# Patient Record
Sex: FEMALE | Race: WHITE
[De-identification: names, ages, dates, MRNs, and addresses within clinical notes are randomized per-mention and may not be internally consistent; named-entity substitution may affect disease eponyms.]

---

## 2017-02-02 ENCOUNTER — TRANSCRIPTION ENCOUNTER (OUTPATIENT)
Age: 77
End: 2017-02-02

## 2017-03-30 ENCOUNTER — TRANSCRIPTION ENCOUNTER (OUTPATIENT)
Age: 77
End: 2017-03-30

## 2017-06-17 ENCOUNTER — OUTPATIENT (OUTPATIENT)
Dept: OUTPATIENT SERVICES | Facility: HOSPITAL | Age: 77
LOS: 1 days | Discharge: HOME | End: 2017-06-17

## 2017-06-28 DIAGNOSIS — E78.2 MIXED HYPERLIPIDEMIA: ICD-10-CM

## 2017-06-28 DIAGNOSIS — E11.9 TYPE 2 DIABETES MELLITUS WITHOUT COMPLICATIONS: ICD-10-CM

## 2017-06-28 DIAGNOSIS — I10 ESSENTIAL (PRIMARY) HYPERTENSION: ICD-10-CM

## 2017-07-08 ENCOUNTER — OUTPATIENT (OUTPATIENT)
Dept: OUTPATIENT SERVICES | Facility: HOSPITAL | Age: 77
LOS: 1 days | Discharge: HOME | End: 2017-07-08

## 2017-07-08 DIAGNOSIS — M25.551 PAIN IN RIGHT HIP: ICD-10-CM

## 2017-09-01 ENCOUNTER — OUTPATIENT (OUTPATIENT)
Dept: OUTPATIENT SERVICES | Facility: HOSPITAL | Age: 77
LOS: 1 days | Discharge: HOME | End: 2017-09-01

## 2017-09-01 DIAGNOSIS — C55 MALIGNANT NEOPLASM OF UTERUS, PART UNSPECIFIED: ICD-10-CM

## 2017-10-06 ENCOUNTER — OUTPATIENT (OUTPATIENT)
Dept: OUTPATIENT SERVICES | Facility: HOSPITAL | Age: 77
LOS: 1 days | Discharge: HOME | End: 2017-10-06

## 2017-10-11 DIAGNOSIS — E66.9 OBESITY, UNSPECIFIED: ICD-10-CM

## 2017-10-11 DIAGNOSIS — H25.89 OTHER AGE-RELATED CATARACT: ICD-10-CM

## 2017-10-11 DIAGNOSIS — Z88.5 ALLERGY STATUS TO NARCOTIC AGENT: ICD-10-CM

## 2017-10-11 DIAGNOSIS — E78.00 PURE HYPERCHOLESTEROLEMIA, UNSPECIFIED: ICD-10-CM

## 2017-10-11 DIAGNOSIS — Z88.2 ALLERGY STATUS TO SULFONAMIDES: ICD-10-CM

## 2017-10-11 DIAGNOSIS — I10 ESSENTIAL (PRIMARY) HYPERTENSION: ICD-10-CM

## 2018-01-30 ENCOUNTER — OUTPATIENT (OUTPATIENT)
Dept: OUTPATIENT SERVICES | Facility: HOSPITAL | Age: 78
LOS: 1 days | Discharge: HOME | End: 2018-01-30

## 2018-01-30 DIAGNOSIS — E78.2 MIXED HYPERLIPIDEMIA: ICD-10-CM

## 2018-01-30 DIAGNOSIS — E11.9 TYPE 2 DIABETES MELLITUS WITHOUT COMPLICATIONS: ICD-10-CM

## 2018-01-30 DIAGNOSIS — I10 ESSENTIAL (PRIMARY) HYPERTENSION: ICD-10-CM

## 2018-05-17 ENCOUNTER — OUTPATIENT (OUTPATIENT)
Dept: OUTPATIENT SERVICES | Facility: HOSPITAL | Age: 78
LOS: 1 days | Discharge: HOME | End: 2018-05-17

## 2018-05-17 DIAGNOSIS — I10 ESSENTIAL (PRIMARY) HYPERTENSION: ICD-10-CM

## 2018-05-17 DIAGNOSIS — E55.9 VITAMIN D DEFICIENCY, UNSPECIFIED: ICD-10-CM

## 2018-05-17 DIAGNOSIS — E78.2 MIXED HYPERLIPIDEMIA: ICD-10-CM

## 2018-05-17 DIAGNOSIS — E11.9 TYPE 2 DIABETES MELLITUS WITHOUT COMPLICATIONS: ICD-10-CM

## 2018-08-11 ENCOUNTER — OUTPATIENT (OUTPATIENT)
Dept: OUTPATIENT SERVICES | Facility: HOSPITAL | Age: 78
LOS: 1 days | Discharge: HOME | End: 2018-08-11

## 2018-08-11 DIAGNOSIS — Z12.31 ENCOUNTER FOR SCREENING MAMMOGRAM FOR MALIGNANT NEOPLASM OF BREAST: ICD-10-CM

## 2018-09-13 ENCOUNTER — OUTPATIENT (OUTPATIENT)
Dept: OUTPATIENT SERVICES | Facility: HOSPITAL | Age: 78
LOS: 1 days | Discharge: HOME | End: 2018-09-13

## 2018-09-13 DIAGNOSIS — R06.00 DYSPNEA, UNSPECIFIED: ICD-10-CM

## 2018-09-15 ENCOUNTER — OUTPATIENT (OUTPATIENT)
Dept: OUTPATIENT SERVICES | Facility: HOSPITAL | Age: 78
LOS: 1 days | Discharge: HOME | End: 2018-09-15

## 2018-09-15 DIAGNOSIS — I10 ESSENTIAL (PRIMARY) HYPERTENSION: ICD-10-CM

## 2018-09-15 DIAGNOSIS — E78.2 MIXED HYPERLIPIDEMIA: ICD-10-CM

## 2018-09-15 DIAGNOSIS — Z00.00 ENCOUNTER FOR GENERAL ADULT MEDICAL EXAMINATION WITHOUT ABNORMAL FINDINGS: ICD-10-CM

## 2018-09-15 DIAGNOSIS — E11.9 TYPE 2 DIABETES MELLITUS WITHOUT COMPLICATIONS: ICD-10-CM

## 2019-04-13 ENCOUNTER — OUTPATIENT (OUTPATIENT)
Dept: OUTPATIENT SERVICES | Facility: HOSPITAL | Age: 79
LOS: 1 days | Discharge: HOME | End: 2019-04-13

## 2019-04-13 DIAGNOSIS — I10 ESSENTIAL (PRIMARY) HYPERTENSION: ICD-10-CM

## 2019-04-13 DIAGNOSIS — E78.2 MIXED HYPERLIPIDEMIA: ICD-10-CM

## 2019-04-18 ENCOUNTER — OUTPATIENT (OUTPATIENT)
Dept: OUTPATIENT SERVICES | Facility: HOSPITAL | Age: 79
LOS: 1 days | Discharge: HOME | End: 2019-04-18

## 2019-04-18 DIAGNOSIS — R53.83 OTHER FATIGUE: ICD-10-CM

## 2019-10-15 ENCOUNTER — OUTPATIENT (OUTPATIENT)
Dept: OUTPATIENT SERVICES | Facility: HOSPITAL | Age: 79
LOS: 1 days | Discharge: HOME | End: 2019-10-15

## 2019-10-15 DIAGNOSIS — I10 ESSENTIAL (PRIMARY) HYPERTENSION: ICD-10-CM

## 2019-10-15 DIAGNOSIS — E78.2 MIXED HYPERLIPIDEMIA: ICD-10-CM

## 2020-01-11 ENCOUNTER — OUTPATIENT (OUTPATIENT)
Dept: OUTPATIENT SERVICES | Facility: HOSPITAL | Age: 80
LOS: 1 days | Discharge: HOME | End: 2020-01-11
Payer: MEDICARE

## 2020-01-11 DIAGNOSIS — R05 COUGH: ICD-10-CM

## 2020-01-11 PROCEDURE — 71046 X-RAY EXAM CHEST 2 VIEWS: CPT | Mod: 26

## 2020-03-20 ENCOUNTER — TRANSCRIPTION ENCOUNTER (OUTPATIENT)
Age: 80
End: 2020-03-20

## 2021-02-06 ENCOUNTER — OUTPATIENT (OUTPATIENT)
Dept: OUTPATIENT SERVICES | Facility: HOSPITAL | Age: 81
LOS: 1 days | Discharge: HOME | End: 2021-02-06
Payer: MEDICARE

## 2021-02-06 DIAGNOSIS — Z12.31 ENCOUNTER FOR SCREENING MAMMOGRAM FOR MALIGNANT NEOPLASM OF BREAST: ICD-10-CM

## 2021-02-06 PROCEDURE — 77063 BREAST TOMOSYNTHESIS BI: CPT | Mod: 26

## 2021-02-06 PROCEDURE — 77067 SCR MAMMO BI INCL CAD: CPT | Mod: 26

## 2021-02-09 DIAGNOSIS — Z78.0 ASYMPTOMATIC MENOPAUSAL STATE: ICD-10-CM

## 2021-02-09 DIAGNOSIS — M89.9 DISORDER OF BONE, UNSPECIFIED: ICD-10-CM

## 2021-02-09 DIAGNOSIS — Z13.820 ENCOUNTER FOR SCREENING FOR OSTEOPOROSIS: ICD-10-CM

## 2021-02-23 ENCOUNTER — OUTPATIENT (OUTPATIENT)
Dept: OUTPATIENT SERVICES | Facility: HOSPITAL | Age: 81
LOS: 1 days | Discharge: HOME | End: 2021-02-23
Payer: MEDICARE

## 2021-02-23 DIAGNOSIS — R92.8 OTHER ABNORMAL AND INCONCLUSIVE FINDINGS ON DIAGNOSTIC IMAGING OF BREAST: ICD-10-CM

## 2021-02-23 PROBLEM — Z00.00 ENCOUNTER FOR PREVENTIVE HEALTH EXAMINATION: Status: ACTIVE | Noted: 2021-02-23

## 2021-02-23 PROCEDURE — G0279: CPT | Mod: 26

## 2021-02-23 PROCEDURE — 77065 DX MAMMO INCL CAD UNI: CPT | Mod: 26,LT

## 2021-02-23 PROCEDURE — 76642 ULTRASOUND BREAST LIMITED: CPT | Mod: 26,LT

## 2021-03-23 ENCOUNTER — APPOINTMENT (OUTPATIENT)
Dept: PULMONOLOGY | Facility: CLINIC | Age: 81
End: 2021-03-23

## 2021-06-30 ENCOUNTER — APPOINTMENT (OUTPATIENT)
Age: 81
End: 2021-06-30
Payer: MEDICARE

## 2021-06-30 VITALS
WEIGHT: 230 LBS | OXYGEN SATURATION: 98 % | DIASTOLIC BLOOD PRESSURE: 78 MMHG | HEART RATE: 76 BPM | BODY MASS INDEX: 36.96 KG/M2 | RESPIRATION RATE: 14 BRPM | HEIGHT: 66 IN | SYSTOLIC BLOOD PRESSURE: 128 MMHG

## 2021-06-30 PROCEDURE — 99213 OFFICE O/P EST LOW 20 MIN: CPT

## 2021-06-30 RX ORDER — ALBUTEROL SULFATE 90 UG/1
108 (90 BASE) INHALANT RESPIRATORY (INHALATION) EVERY 4 HOURS
Qty: 1 | Refills: 3 | Status: ACTIVE | COMMUNITY
Start: 2021-06-30 | End: 1900-01-01

## 2021-06-30 NOTE — DISCUSSION/SUMMARY
[FreeTextEntry1] : KYLE COUNSELED ABOUT CPAP USAGE WILL CHANGE MASK ALSO WITH HX OF A FIB\par SOB/ WHEEZING INHLAERS AS NEEDED\par PFT\par WEIGHT LOSS

## 2021-08-24 ENCOUNTER — OUTPATIENT (OUTPATIENT)
Dept: OUTPATIENT SERVICES | Facility: HOSPITAL | Age: 81
LOS: 1 days | Discharge: HOME | End: 2021-08-24
Payer: MEDICARE

## 2021-08-24 DIAGNOSIS — R92.8 OTHER ABNORMAL AND INCONCLUSIVE FINDINGS ON DIAGNOSTIC IMAGING OF BREAST: ICD-10-CM

## 2021-08-24 PROCEDURE — G0279: CPT | Mod: 26

## 2021-08-24 PROCEDURE — 77065 DX MAMMO INCL CAD UNI: CPT | Mod: 26,LT

## 2021-11-18 ENCOUNTER — TRANSCRIPTION ENCOUNTER (OUTPATIENT)
Age: 81
End: 2021-11-18

## 2021-12-17 ENCOUNTER — LABORATORY RESULT (OUTPATIENT)
Age: 81
End: 2021-12-17

## 2021-12-20 ENCOUNTER — APPOINTMENT (OUTPATIENT)
Age: 81
End: 2021-12-20
Payer: MEDICARE

## 2021-12-20 VITALS
HEART RATE: 89 BPM | SYSTOLIC BLOOD PRESSURE: 120 MMHG | RESPIRATION RATE: 12 BRPM | DIASTOLIC BLOOD PRESSURE: 80 MMHG | HEIGHT: 66 IN | WEIGHT: 214 LBS | BODY MASS INDEX: 34.39 KG/M2 | OXYGEN SATURATION: 100 %

## 2021-12-20 PROCEDURE — 99213 OFFICE O/P EST LOW 20 MIN: CPT | Mod: 25

## 2021-12-20 PROCEDURE — 94010 BREATHING CAPACITY TEST: CPT

## 2021-12-20 PROCEDURE — 94729 DIFFUSING CAPACITY: CPT

## 2021-12-20 PROCEDURE — 94727 GAS DIL/WSHOT DETER LNG VOL: CPT

## 2021-12-20 NOTE — DISCUSSION/SUMMARY
[FreeTextEntry1] : KYLE COUNSELED ABOUT CPAP USAGE AND COMPLIANCE\par SOB/ WHEEZING INHLAERS AS NEEDED PFT REVIEWED\par WEIGHT LOSS

## 2022-06-20 ENCOUNTER — APPOINTMENT (OUTPATIENT)
Age: 82
End: 2022-06-20
Payer: MEDICARE

## 2022-06-20 VITALS
BODY MASS INDEX: 33.59 KG/M2 | SYSTOLIC BLOOD PRESSURE: 126 MMHG | WEIGHT: 209 LBS | RESPIRATION RATE: 12 BRPM | DIASTOLIC BLOOD PRESSURE: 76 MMHG | HEART RATE: 101 BPM | HEIGHT: 66 IN | OXYGEN SATURATION: 99 %

## 2022-06-20 PROCEDURE — 99213 OFFICE O/P EST LOW 20 MIN: CPT

## 2022-06-20 NOTE — REASON FOR VISIT
[Follow-Up] : a follow-up visit [Asthma] : asthma [Sleep Apnea] : sleep apnea [Cough] : cough [Shortness of Breath] : shortness of breath

## 2022-06-20 NOTE — HISTORY OF PRESENT ILLNESS
[TextBox_4] : ASTHMA AS NEEDED RESCUE INHALERS DOING WELL SP PFT\par KYLE COMPLIANT AND BENEFITING\par SOB ON EXERTION

## 2022-06-20 NOTE — DISCUSSION/SUMMARY
[FreeTextEntry1] : KYLE COUNSELED ABOUT CPAP USAGE AND COMPLIANCE\par SOB/ WHEEZING INHALERS AS NEEDED PFT REVIEWED\par WEIGHT LOSS\par CXR

## 2022-07-15 ENCOUNTER — NON-APPOINTMENT (OUTPATIENT)
Age: 82
End: 2022-07-15

## 2022-07-15 ENCOUNTER — APPOINTMENT (OUTPATIENT)
Dept: OPHTHALMOLOGY | Facility: CLINIC | Age: 82
End: 2022-07-15

## 2022-07-15 PROCEDURE — 92004 COMPRE OPH EXAM NEW PT 1/>: CPT

## 2022-07-15 PROCEDURE — 92083 EXTENDED VISUAL FIELD XM: CPT

## 2022-07-15 PROCEDURE — 92133 CPTRZD OPH DX IMG PST SGM ON: CPT

## 2022-07-23 ENCOUNTER — RESULT REVIEW (OUTPATIENT)
Age: 82
End: 2022-07-23

## 2022-07-23 ENCOUNTER — OUTPATIENT (OUTPATIENT)
Dept: OUTPATIENT SERVICES | Facility: HOSPITAL | Age: 82
LOS: 1 days | Discharge: HOME | End: 2022-07-23

## 2022-07-23 DIAGNOSIS — R51.9 HEADACHE, UNSPECIFIED: ICD-10-CM

## 2022-07-23 DIAGNOSIS — R07.9 CHEST PAIN, UNSPECIFIED: ICD-10-CM

## 2022-07-23 PROCEDURE — 71046 X-RAY EXAM CHEST 2 VIEWS: CPT | Mod: 26

## 2022-07-23 PROCEDURE — 70553 MRI BRAIN STEM W/O & W/DYE: CPT | Mod: 26,MH

## 2022-07-23 PROCEDURE — 70543 MRI ORBT/FAC/NCK W/O &W/DYE: CPT | Mod: 26,MH

## 2022-08-29 ENCOUNTER — APPOINTMENT (OUTPATIENT)
Dept: OPHTHALMOLOGY | Facility: CLINIC | Age: 82
End: 2022-08-29

## 2022-08-29 ENCOUNTER — NON-APPOINTMENT (OUTPATIENT)
Age: 82
End: 2022-08-29

## 2022-08-29 PROCEDURE — 92133 CPTRZD OPH DX IMG PST SGM ON: CPT

## 2022-08-29 PROCEDURE — 92014 COMPRE OPH EXAM EST PT 1/>: CPT

## 2022-08-29 PROCEDURE — 92083 EXTENDED VISUAL FIELD XM: CPT

## 2022-09-17 ENCOUNTER — OUTPATIENT (OUTPATIENT)
Dept: OUTPATIENT SERVICES | Facility: HOSPITAL | Age: 82
LOS: 1 days | Discharge: HOME | End: 2022-09-17

## 2022-09-17 DIAGNOSIS — Z12.31 ENCOUNTER FOR SCREENING MAMMOGRAM FOR MALIGNANT NEOPLASM OF BREAST: ICD-10-CM

## 2022-09-17 PROCEDURE — 77063 BREAST TOMOSYNTHESIS BI: CPT | Mod: 26

## 2022-09-17 PROCEDURE — 77067 SCR MAMMO BI INCL CAD: CPT | Mod: 26

## 2022-12-02 ENCOUNTER — APPOINTMENT (OUTPATIENT)
Age: 82
End: 2022-12-02

## 2022-12-02 VITALS
SYSTOLIC BLOOD PRESSURE: 116 MMHG | RESPIRATION RATE: 14 BRPM | OXYGEN SATURATION: 96 % | HEART RATE: 99 BPM | DIASTOLIC BLOOD PRESSURE: 82 MMHG | WEIGHT: 215 LBS | HEIGHT: 66 IN | BODY MASS INDEX: 34.55 KG/M2

## 2022-12-02 PROCEDURE — 99213 OFFICE O/P EST LOW 20 MIN: CPT

## 2022-12-02 RX ORDER — ALBUTEROL SULFATE 90 UG/1
108 (90 BASE) INHALANT RESPIRATORY (INHALATION) EVERY 4 HOURS
Qty: 1 | Refills: 3 | Status: ACTIVE | COMMUNITY
Start: 2022-12-02 | End: 1900-01-01

## 2022-12-02 NOTE — DISCUSSION/SUMMARY
[FreeTextEntry1] : KYLE COUNSELED ABOUT CPAP USAGE AND COMPLIANCE\par SOB/ WHEEZING INHALERS AS NEEDED PFT REVIEWED ( DO NOT WANT MAINTENANCE)\par WEIGHT LOSS\par CXR REVIEWED

## 2022-12-02 NOTE — HISTORY OF PRESENT ILLNESS
[TextBox_4] : ASTHMA AS NEEDED RESCUE INHALERS DOING WELL \par KYLE COMPLIANT AND BENEFITING\par SOB ON EXERTION

## 2023-06-05 ENCOUNTER — APPOINTMENT (OUTPATIENT)
Dept: PULMONOLOGY | Facility: CLINIC | Age: 83
End: 2023-06-05
Payer: MEDICARE

## 2023-06-05 VITALS
WEIGHT: 204 LBS | HEIGHT: 66 IN | HEART RATE: 70 BPM | SYSTOLIC BLOOD PRESSURE: 132 MMHG | RESPIRATION RATE: 14 BRPM | DIASTOLIC BLOOD PRESSURE: 80 MMHG | OXYGEN SATURATION: 99 % | BODY MASS INDEX: 32.78 KG/M2

## 2023-06-05 DIAGNOSIS — J45.909 UNSPECIFIED ASTHMA, UNCOMPLICATED: ICD-10-CM

## 2023-06-05 DIAGNOSIS — G47.33 OBSTRUCTIVE SLEEP APNEA (ADULT) (PEDIATRIC): ICD-10-CM

## 2023-06-05 DIAGNOSIS — R06.02 SHORTNESS OF BREATH: ICD-10-CM

## 2023-06-05 PROCEDURE — 99213 OFFICE O/P EST LOW 20 MIN: CPT

## 2023-06-05 NOTE — HISTORY OF PRESENT ILLNESS
[TextBox_4] : ASTHMA AS NEEDED RESCUE INHALERS DOING WELL \par KYLE COMPLIANT AND BENEFITING\par SOB ON EXERTION INTERMITTENT WHEEZING

## 2023-06-05 NOTE — DISCUSSION/SUMMARY
[FreeTextEntry1] : KYLE COUNSELED ABOUT CPAP USAGE AND COMPLIANCE\par SOB/ WHEEZING INHALERS AS NEEDED PFT REVIEWED ( DO NOT WANT MAINTENANCE)\par POX ON EXERTION\par WEIGHT LOSS\par CXR REVIEWED 7/2022

## 2023-07-01 ENCOUNTER — EMERGENCY (EMERGENCY)
Facility: HOSPITAL | Age: 83
LOS: 0 days | Discharge: HOME | End: 2023-07-02
Attending: EMERGENCY MEDICINE
Payer: MEDICARE

## 2023-07-01 VITALS
HEIGHT: 66 IN | OXYGEN SATURATION: 99 % | HEART RATE: 98 BPM | TEMPERATURE: 96 F | RESPIRATION RATE: 18 BRPM | WEIGHT: 205.03 LBS | SYSTOLIC BLOOD PRESSURE: 157 MMHG | DIASTOLIC BLOOD PRESSURE: 80 MMHG
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
## 2023-07-01 DIAGNOSIS — Z88.5 ALLERGY STATUS TO NARCOTIC AGENT: ICD-10-CM

## 2023-07-01 DIAGNOSIS — Z88.2 ALLERGY STATUS TO SULFONAMIDES: ICD-10-CM

## 2023-07-01 DIAGNOSIS — S09.90XA UNSPECIFIED INJURY OF HEAD, INITIAL ENCOUNTER: ICD-10-CM
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
## 2023-07-01 PROCEDURE — 70450 CT HEAD/BRAIN W/O DYE: CPT | Mod: 26,MA

## 2023-07-01 PROCEDURE — 99284 EMERGENCY DEPT VISIT MOD MDM: CPT | Mod: 25

## 2023-07-01 PROCEDURE — 72125 CT NECK SPINE W/O DYE: CPT | Mod: 26,MA

## 2023-07-01 PROCEDURE — 99284 EMERGENCY DEPT VISIT MOD MDM: CPT

## 2023-07-01 PROCEDURE — 70450 CT HEAD/BRAIN W/O DYE: CPT | Mod: MA

## 2023-07-01 PROCEDURE — 72125 CT NECK SPINE W/O DYE: CPT | Mod: MA

## 2023-07-01 NOTE — ED ADULT TRIAGE NOTE - CHIEF COMPLAINT QUOTE
mechanical fall at home down three steps and hit head on wall, on blood thinners, complaining of head ache

## 2023-07-02 NOTE — ED PROVIDER NOTE - ATTENDING APP SHARED VISIT CONTRIBUTION OF CARE
I have personally performed a history and physical exam on this patient and personally directed the management of the patient.  Patient 82-year-old female presents for evaluation of close head injury as she sustained a fall while tripping while walking down steps approximately several hours prior to arrival no LOC no vomiting patient is on Eliquis she denies any headache visual changes neck pain chest pain shortness of breath weakness abdominal pain or back pain she is able to ambulate well she was able to stand and walk without any hip pain    On physical exam patient is normocephalic/atraumatic pupils equal round reactive light accommodation extraocular muscles intact no hemotympanum no lund sign no raccoon eyes no septal no tenderness palpation CTL spine full range of motion of the neck chest clear auscultation bilaterally abdomen soft nontender nondistended bowel sounds positive no guarding or rebound extremities pedal pulses 2+ radial pulse 2+ no edema noted patient able to ambulate without any issues here    Assessment plan patient sustained a mechanical fall with no LOC no vomiting on Eliquis because of this we obtained CT head which was negative for acute abnormalities CT C-spine which reveals no fractures patient improved here in the emergency department discussed indications to return discussed concussion protocol with close head injury patient agreed with discharge follow-up with her PMD for further evaluation

## 2023-07-02 NOTE — ED PROVIDER NOTE - OBJECTIVE STATEMENT
Patient 82-year-old female presents for evaluation of close head injury as she sustained a fall while tripping while walking down steps approximately several hours prior to arrival no LOC no vomiting patient is on Eliquis she denies any headache visual changes neck pain chest pain shortness of breath weakness abdominal pain or back pain she is able to ambulate well she was able to stand and walk without any hip pain

## 2023-07-02 NOTE — ED PROVIDER NOTE - CLINICAL SUMMARY MEDICAL DECISION MAKING FREE TEXT BOX
patient sustained a mechanical fall with no LOC no vomiting on Eliquis because of this we obtained CT head which was negative for acute abnormalities CT C-spine which reveals no fractures patient improved here in the emergency department discussed indications to return discussed concussion protocol with close head injury patient agreed with discharge follow-up with her PMD for further evaluation

## 2023-07-02 NOTE — ED PROVIDER NOTE - NSFOLLOWUPINSTRUCTIONS_ED_ALL_ED_FT
SEE YOUR DOCTOR IN THE NEXT 24 HOURS  RETURN FOR ANY FURTHER CONCERNS      Closed Head Injury    A closed head injury is an injury to your head that may or may not involve a traumatic brain injury (TBI).  A CT scan of the head may not have been performed because they are usually normal after a concussion. Concussions are diagnosed and managed based on the history given and the symptoms experienced after the head injury. Most concussions do not cause serious problem and get better over several days.  Symptoms of TBI can be short or long lasting and include headache, dizziness, interference with memory or speech, fatigue, confusion, changes in sleep, mood changes, nausea, depression/anxiety, and dulling of senses. Make sure to obtain proper rest which includes getting plenty of sleep, avoiding excessive visual stimulation, and avoiding activities that may cause physical or mental stress. Avoid any situation where there is potential for another head injury, including sports.    SEEK IMMEDIATE MEDICAL CARE IF YOU HAVE ANY OF THE FOLLOWING SYMPTOMS: unusual drowsiness, vomiting, severe dizziness, seizures, lightheadedness, muscular weakness, different pupil sizes, visual changes, or clear or bloody discharge from your ears or nose.

## 2023-07-02 NOTE — ED PROVIDER NOTE - PATIENT PORTAL LINK FT
You can access the FollowMyHealth Patient Portal offered by [facility] by registering at the following website: [email or web address]. By joining [facility]’s FollowMyHealth portal, you will also be able to view your health information using other applications (apps) compatible with our system.

## 2023-07-02 NOTE — ED PROVIDER NOTE - NS ED ATTENDING STATEMENT MOD
This was a shared visit with the GARRISON. I reviewed and verified the documentation and independently performed the documented:

## 2023-07-02 NOTE — ED ADULT NURSE NOTE - NSFALLRISKINTERV_ED_ALL_ED

## 2023-12-06 ENCOUNTER — APPOINTMENT (OUTPATIENT)
Dept: PULMONOLOGY | Facility: CLINIC | Age: 83
End: 2023-12-06

## 2024-02-17 ENCOUNTER — EMERGENCY (EMERGENCY)
Facility: HOSPITAL | Age: 84
LOS: 0 days | Discharge: HOME | End: 2024-02-17
Attending: EMERGENCY MEDICINE
Payer: MEDICARE

## 2024-02-17 VITALS
RESPIRATION RATE: 17 BRPM | WEIGHT: 293 LBS | TEMPERATURE: 98 F | HEIGHT: 66 IN | DIASTOLIC BLOOD PRESSURE: 73 MMHG | HEART RATE: 87 BPM | SYSTOLIC BLOOD PRESSURE: 152 MMHG

## 2024-02-17 DIAGNOSIS — M10.9 GOUT, UNSPECIFIED: ICD-10-CM

## 2024-02-17 DIAGNOSIS — S09.90XA UNSPECIFIED INJURY OF HEAD, INITIAL ENCOUNTER: ICD-10-CM
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
## 2024-02-17 DIAGNOSIS — S01.01XA LACERATION WITHOUT FOREIGN BODY OF SCALP, INITIAL ENCOUNTER: ICD-10-CM

## 2024-02-17 DIAGNOSIS — I10 ESSENTIAL (PRIMARY) HYPERTENSION: ICD-10-CM

## 2024-02-17 DIAGNOSIS — Z88.0 ALLERGY STATUS TO PENICILLIN: ICD-10-CM

## 2024-02-17 DIAGNOSIS — E11.9 TYPE 2 DIABETES MELLITUS WITHOUT COMPLICATIONS: ICD-10-CM

## 2024-02-17 DIAGNOSIS — Z79.01 LONG TERM (CURRENT) USE OF ANTICOAGULANTS: ICD-10-CM

## 2024-02-17 DIAGNOSIS — Z88.2 ALLERGY STATUS TO SULFONAMIDES: ICD-10-CM
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
## 2024-02-17 LAB
ALBUMIN SERPL ELPH-MCNC: 4.3 G/DL — SIGNIFICANT CHANGE UP (ref 3.5–5.2)
ALP SERPL-CCNC: 67 U/L — SIGNIFICANT CHANGE UP (ref 30–115)
ALT FLD-CCNC: 12 U/L — SIGNIFICANT CHANGE UP (ref 0–41)
ANION GAP SERPL CALC-SCNC: 16 MMOL/L — HIGH (ref 7–14)
APTT BLD: 33.8 SEC — SIGNIFICANT CHANGE UP (ref 27–39.2)
AST SERPL-CCNC: 13 U/L — SIGNIFICANT CHANGE UP (ref 0–41)
BASOPHILS # BLD AUTO: 0.04 K/UL — SIGNIFICANT CHANGE UP (ref 0–0.2)
BASOPHILS NFR BLD AUTO: 0.6 % — SIGNIFICANT CHANGE UP (ref 0–1)
BILIRUB SERPL-MCNC: 0.4 MG/DL — SIGNIFICANT CHANGE UP (ref 0.2–1.2)
BUN SERPL-MCNC: 21 MG/DL — HIGH (ref 10–20)
CALCIUM SERPL-MCNC: 9.7 MG/DL — SIGNIFICANT CHANGE UP (ref 8.4–10.5)
CHLORIDE SERPL-SCNC: 103 MMOL/L — SIGNIFICANT CHANGE UP (ref 98–110)
CO2 SERPL-SCNC: 21 MMOL/L — SIGNIFICANT CHANGE UP (ref 17–32)
CREAT SERPL-MCNC: 1.1 MG/DL — SIGNIFICANT CHANGE UP (ref 0.7–1.5)
EGFR: 50 ML/MIN/1.73M2 — LOW
EOSINOPHIL # BLD AUTO: 0.2 K/UL — SIGNIFICANT CHANGE UP (ref 0–0.7)
EOSINOPHIL NFR BLD AUTO: 2.9 % — SIGNIFICANT CHANGE UP (ref 0–8)
GLUCOSE SERPL-MCNC: 143 MG/DL — HIGH (ref 70–99)
HCT VFR BLD CALC: 39.6 % — SIGNIFICANT CHANGE UP (ref 37–47)
HGB BLD-MCNC: 13.6 G/DL — SIGNIFICANT CHANGE UP (ref 12–16)
IMM GRANULOCYTES NFR BLD AUTO: 0.6 % — HIGH (ref 0.1–0.3)
INR BLD: 1.08 RATIO — SIGNIFICANT CHANGE UP (ref 0.65–1.3)
LACTATE SERPL-SCNC: 3.4 MMOL/L — HIGH (ref 0.7–2)
LIDOCAIN IGE QN: 62 U/L — HIGH (ref 7–60)
LYMPHOCYTES # BLD AUTO: 2.46 K/UL — SIGNIFICANT CHANGE UP (ref 1.2–3.4)
LYMPHOCYTES # BLD AUTO: 35.3 % — SIGNIFICANT CHANGE UP (ref 20.5–51.1)
MCHC RBC-ENTMCNC: 33.2 PG — HIGH (ref 27–31)
MCHC RBC-ENTMCNC: 34.3 G/DL — SIGNIFICANT CHANGE UP (ref 32–37)
MCV RBC AUTO: 96.6 FL — SIGNIFICANT CHANGE UP (ref 81–99)
MONOCYTES # BLD AUTO: 0.66 K/UL — HIGH (ref 0.1–0.6)
MONOCYTES NFR BLD AUTO: 9.5 % — HIGH (ref 1.7–9.3)
NEUTROPHILS # BLD AUTO: 3.57 K/UL — SIGNIFICANT CHANGE UP (ref 1.4–6.5)
NEUTROPHILS NFR BLD AUTO: 51.1 % — SIGNIFICANT CHANGE UP (ref 42.2–75.2)
NRBC # BLD: 0 /100 WBCS — SIGNIFICANT CHANGE UP (ref 0–0)
PLATELET # BLD AUTO: 230 K/UL — SIGNIFICANT CHANGE UP (ref 130–400)
PMV BLD: 9.2 FL — SIGNIFICANT CHANGE UP (ref 7.4–10.4)
POTASSIUM SERPL-MCNC: 4.2 MMOL/L — SIGNIFICANT CHANGE UP (ref 3.5–5)
POTASSIUM SERPL-SCNC: 4.2 MMOL/L — SIGNIFICANT CHANGE UP (ref 3.5–5)
PROT SERPL-MCNC: 6.6 G/DL — SIGNIFICANT CHANGE UP (ref 6–8)
PROTHROM AB SERPL-ACNC: 12.3 SEC — SIGNIFICANT CHANGE UP (ref 9.95–12.87)
RBC # BLD: 4.1 M/UL — LOW (ref 4.2–5.4)
RBC # FLD: 13.3 % — SIGNIFICANT CHANGE UP (ref 11.5–14.5)
SODIUM SERPL-SCNC: 140 MMOL/L — SIGNIFICANT CHANGE UP (ref 135–146)
WBC # BLD: 6.97 K/UL — SIGNIFICANT CHANGE UP (ref 4.8–10.8)
WBC # FLD AUTO: 6.97 K/UL — SIGNIFICANT CHANGE UP (ref 4.8–10.8)

## 2024-02-17 PROCEDURE — 85025 COMPLETE CBC W/AUTO DIFF WBC: CPT

## 2024-02-17 PROCEDURE — 72125 CT NECK SPINE W/O DYE: CPT | Mod: 26,MA

## 2024-02-17 PROCEDURE — 99285 EMERGENCY DEPT VISIT HI MDM: CPT | Mod: 25

## 2024-02-17 PROCEDURE — 73070 X-RAY EXAM OF ELBOW: CPT | Mod: RT

## 2024-02-17 PROCEDURE — 73070 X-RAY EXAM OF ELBOW: CPT | Mod: 26,RT

## 2024-02-17 PROCEDURE — 71045 X-RAY EXAM CHEST 1 VIEW: CPT | Mod: 26

## 2024-02-17 PROCEDURE — 85610 PROTHROMBIN TIME: CPT

## 2024-02-17 PROCEDURE — 36415 COLL VENOUS BLD VENIPUNCTURE: CPT

## 2024-02-17 PROCEDURE — 83605 ASSAY OF LACTIC ACID: CPT

## 2024-02-17 PROCEDURE — 71045 X-RAY EXAM CHEST 1 VIEW: CPT

## 2024-02-17 PROCEDURE — 70450 CT HEAD/BRAIN W/O DYE: CPT | Mod: 26,MA

## 2024-02-17 PROCEDURE — 72170 X-RAY EXAM OF PELVIS: CPT | Mod: 26

## 2024-02-17 PROCEDURE — 70450 CT HEAD/BRAIN W/O DYE: CPT | Mod: MA

## 2024-02-17 PROCEDURE — 12002 RPR S/N/AX/GEN/TRNK2.6-7.5CM: CPT

## 2024-02-17 PROCEDURE — 72125 CT NECK SPINE W/O DYE: CPT | Mod: MA

## 2024-02-17 PROCEDURE — 83690 ASSAY OF LIPASE: CPT

## 2024-02-17 PROCEDURE — 72170 X-RAY EXAM OF PELVIS: CPT

## 2024-02-17 PROCEDURE — 80053 COMPREHEN METABOLIC PANEL: CPT

## 2024-02-17 PROCEDURE — 85730 THROMBOPLASTIN TIME PARTIAL: CPT

## 2024-02-17 PROCEDURE — 82962 GLUCOSE BLOOD TEST: CPT

## 2024-02-17 PROCEDURE — 99284 EMERGENCY DEPT VISIT MOD MDM: CPT

## 2024-02-17 RX ORDER — SODIUM CHLORIDE 9 MG/ML
500 INJECTION, SOLUTION INTRAVENOUS ONCE
Refills: 0 | Status: DISCONTINUED | OUTPATIENT
Start: 2024-02-17 | End: 2024-02-17

## 2024-02-17 RX ORDER — TETANUS TOXOID, REDUCED DIPHTHERIA TOXOID AND ACELLULAR PERTUSSIS VACCINE, ADSORBED 5; 2.5; 8; 8; 2.5 [IU]/.5ML; [IU]/.5ML; UG/.5ML; UG/.5ML; UG/.5ML
0.5 SUSPENSION INTRAMUSCULAR ONCE
Refills: 0 | Status: DISCONTINUED | OUTPATIENT
Start: 2024-02-17 | End: 2024-02-17

## 2024-02-17 NOTE — CONSULT NOTE ADULT - ATTENDING COMMENTS
Trauma Attending H&P Attestation    Patient seen and evaluated with the trauma team in the trauma bay upon arrival. All pertinent labs and radiographic imaging reviewed, pending final reports. Outpatient medications reviewed, including the presence of anticoagulants, if applicable. I agree with the resident's note above, including the physical exam findings, assessment and plan as documented with the following adjustments.     Trauma Level: [ ] Code  [x ] Alert  [ ] Consult [ ] Transfer in  Patient is seen at the bedside at 00:49  Activation by:  [ x] ED physician [ x] EMS  Intubated in Field? [ ] Yes [ x] No  Intubated in ED? [ ] Yes [x ] No  Intubated in Trauma [place]? [ ] Yes [x ] No    ALFREDA RODRIGUEZ Patient is a 83y old  Female who presents with a chief complaint of fall on Xarelto, scalp laceration  Patient presented with GCS [15 ]  upon arrival to the trauma bay.  Allergies  sulfa drugs (Anaphylaxis)  codeine (Anaphylaxis)  Intolerances    PAST MEDICAL & SURGICAL HISTORY:    On AC/Antiplatelets [ x] Yes [ ] No              [x ] NOVACs, [ ] Coumadin, [ ] ASA, [ ] Antiplatelets     Vital Signs Last 24 Hrs  T(C): 36.6 (17 Feb 2024 00:40), Max: 36.6 (17 Feb 2024 00:40)  T(F): 97.9 (17 Feb 2024 00:40), Max: 97.9 (17 Feb 2024 00:40)  HR: 87 (17 Feb 2024 00:40) (87 - 87)  BP: 152/73 (17 Feb 2024 00:40) (152/73 - 152/73)  BP(mean): --  RR: 17 (17 Feb 2024 00:40) (17 - 17)  SpO2: --    Parameters below as of 17 Feb 2024 00:40  Patient On (Oxygen Delivery Method): room air  PE: scalp laceration without bleeding   Assessment: fall on AC  PLAN  - supportive care  - GI/DVT prophylaxis  - pain management  - repeat studies as needed  - complete and follow up on trauma work up included but not limites to                          [ x] CXR [x ] PXR [x ] Extremities X-RAYs                          [ x] NCHCT [x ] C-Spine CT [ x] CT Chest [x ] CT Abdomen/Pelvis                          [ x] FAST [ ] Other                          [x ] Trauma Labs   [ ] Toxicology   - Follow up Consults  [ ] Neurosurgery [ ] Orthopaedics [ ] Plastics [ ] Fascial/OMFS [ ] Opthalmology   [ ] Urology  [ ] ENT                                          [ ] Medicine [ ] Geriatrics [ ] Cardiology/EP [ ] Hospice/Palliative Care                                        [ ] Pediatric ICU  [ ] SICU/SDU [ ] Burn/Burn ICU  - IV ABx give as indicated [ ] Yes [ x] No  - Tetanus given as indicated [ ] Yes [x ] No  - Seizures prophylaxis  [ ] Yes,  [x ] No    Iris Cleaning MD, FACS  Trauma/ACS/Surgical Critical Care Attending

## 2024-02-17 NOTE — ED ADULT NURSE NOTE - OBJECTIVE STATEMENT
She tripped and fell down four steps indoors, she hit the back of her head, she has a cut to the occipital,  she is on Xarelto - EMS  Patient denies LOC,

## 2024-02-17 NOTE — ED PROVIDER NOTE - OBJECTIVE STATEMENT
82 y/o F, pmhx of afib on xeralto (20mg QD), HTN, DM, gout, BIBEMS s/p fall around 12:30 AM this morning. At the time, she was trying to reach for object on stairs but accidentally lost balance and fell backwards down 4-5 stairs, striking her the back of her head. Denies LOC because she got up and was able to ambulate shortly afterwards and called an ambulance. Xeralto last taken this evening. Endorses pain to the posterior scalp where she is aware of the bleeding laceration. Otherwise she denies pain any where else. Denies chest pain, abd pain, sob, N/V, lower extrem pain.

## 2024-02-17 NOTE — ED ADULT TRIAGE NOTE - CHIEF COMPLAINT QUOTE
She tripped and fell down four steps indoors, she hit the back of her head, she is on Xarelto - EMS She tripped and fell down four steps indoors, she hit the back of her head, she has a cut to the occipital,  she is on Xarelto - EMS  Patient denies LOC,

## 2024-02-17 NOTE — ED PROVIDER NOTE - CLINICAL SUMMARY MEDICAL DECISION MAKING FREE TEXT BOX
83-year-old female history of A-fib on Eliquis presenting status post mechanical trip and fall this evening.  Positive head injury, no LOC.  Currently complaining of pain to scalp, no other injuries or acute complaints per patient.  Unknown last tetanus.  Well appearing, NAD, non toxic.  Posterior scalp laceration PERRLA EOMI neck supple non tender normal wob cta bl rrr abdomen s nt nd no rebound no guarding WWPx4 neuro non focal no midline CTL spine tenderness palpation, no signs of trauma throughout trunk.  Ambulatory.  Imaging reviewed.  Laceration irrigated and repaired.  Aware of all results, given a copy of all available results, comfortable with discharge and follow-up outpatient, strict return precautions given. Endorses understanding and aware they can return at any time for further evaluation. No further questions or concerns at this time.

## 2024-02-17 NOTE — CONSULT NOTE ADULT - ASSESSMENT
ASSESSMENT:  83yF w/ PMHx of Afib on xarelto seen as Trauma Alert  s/p mechanical fall down stairs with complaint of posterior scalp pain , external signs of trauma include 2 cm posterior scalp laceration without active extravasation . Trauma assessment in ED: ABCs intact , GCS 15 , AAOx3,  STOVALL.     Injuries identified:   - 2 cm linear posterior scalp laceration    PLAN:   - Trauma Labs: (CBC, BMP, Coags, T&S, UA, EtOH level)  Additional studies:  EKG      Trauma Imaging to include the following:  - CXR, Pelvic Xray  - CT Head,  CT C-spine   - Extremity films: None      Disposition and final plan pending results of above labs and imaging      Above plan discussed with Trauma attending, Dr. Cleaning , patient, patient family, and ED team  --------------------------------------------------------------------------------------  02-17-24 @ 01:28 ASSESSMENT:  83yF w/ PMHx of Afib on Xarelto seen as Trauma Alert  s/p mechanical fall down stairs with complaint of posterior scalp pain , external signs of trauma include 2 cm posterior scalp laceration without active extravasation . Trauma assessment in ED: ABCs intact , GCS 15 , AAOx3,  STOVALL. CT head/C-Spine negative for acute traumatic injuries. Patient pain well controlled. Patient ambulating appropriately.    Injuries identified:   - 2 cm linear posterior scalp laceration    PLAN:   - Trauma workup negative for any acute traumatic injuries  - No further trauma surgery workup indicated at this time  - Will plan for TTS 2/18 if patient admitted  - Disposition per ED      Above plan discussed with Trauma attending, Dr. Cleaning , patient, patient family, and ED team  --------------------------------------------------------------------------------------  02-17-24 @ 01:28 ASSESSMENT:  83yF w/ PMHx of Afib on Xarelto seen as Trauma Alert  s/p mechanical fall down stairs with complaint of posterior scalp pain , external signs of trauma include 2 cm posterior scalp laceration without active extravasation . Trauma assessment in ED: ABCs intact , GCS 15 , AAOx3,  STOVALL. CT head/C-Spine negative for acute traumatic injuries. Patient pain well controlled. Patient ambulating appropriately.    Injuries identified:   - 2 cm linear posterior scalp laceration    PLAN:   - Trauma workup negative for any acute traumatic injuries  - No further trauma surgery workup indicated at this time  - Will plan for TTS 2/18 if patient admitted  - Disposition per ED    Above plan discussed with Trauma attending, Dr. Cleaning , patient, patient family, and ED team  --------------------------------------------------------------------------------------  02-17-24 @ 01:28

## 2024-02-17 NOTE — ED PROVIDER NOTE - PATIENT PORTAL LINK FT
You can access the FollowMyHealth Patient Portal offered by [facility] by registering at the following website: [email or web address]. By joining [email or web address] FollowMyHealth portal, you will also be able to view your health information using other applications (apps) compatible with our system.

## 2024-02-17 NOTE — ED PROVIDER NOTE - NSFOLLOWUPINSTRUCTIONS_ED_ALL_ED_FT
Please go to primary care physician, or urgent care, or return to the emergency department for staples removal in 7-10 days.    Follow up with your primary care physician. Return to the emergency department if your symptoms do not resolve and/or worsen. If you've been prescribed medications, please take your medications as prescribed.    Laceration  A laceration is a cut that goes through all of the layers of the skin and into the tissue that is right under the skin. Some lacerations heal on their own. Others need to be closed with skin adhesive strips, skin glue, stitches (sutures), or staples. Proper laceration care minimizes the risk of infection and helps the laceration to heal better.  If non-absorbable stitches or staples have been placed, they must be taken out within the time frame instructed by your healthcare provider.    SEEK IMMEDIATE MEDICAL CARE IF YOU HAVE ANY OF THE FOLLOWING SYMPTOMS: swelling around the wound, worsening pain, drainage from the wound, red streaking going away from your wound, inability to move finger or toe near the laceration, or discoloration of skin near the laceration.

## 2024-02-25 ENCOUNTER — NON-APPOINTMENT (OUTPATIENT)
Age: 84
End: 2024-02-25

## 2024-08-06 ENCOUNTER — APPOINTMENT (OUTPATIENT)
Dept: SURGERY | Facility: CLINIC | Age: 84
End: 2024-08-06

## 2024-08-06 PROCEDURE — 99205 OFFICE O/P NEW HI 60 MIN: CPT

## 2024-08-07 NOTE — HISTORY OF PRESENT ILLNESS
[de-identified] : ALFREDA RODRIGUEZ  is a pleasant 83 year old woman  who came in 08/06/2024 for panc cyst . She has Dr SUZIE DIAZ as Her PCP.  she was sent by dr Moreno s/p left lower abdominal pain s/p diverticulitis, she saw dr Moreno for this as her [employer] retired, she had CT to rule out diverticulitis but she found to have panc cyst, MRI done and showed risk features     MRCP With & Without Intravenous Contrast Examination date: 06/27/2024  Impression:  1. 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 Worrisome features, mural nodule, is found in two of the lesions, measuring 18 mm in the tall and 13 mm in the neck of the pancreas. Consider aspiration biopsy/surgical consultation.   2. Significant hepatic steatosis.    OT Abdomen and Pelvis with Contrast Examination Date: 06/19/2024  Impression: 1. Mild uncomplicated diverticulitis of the distal descending colon. Background of pancolonic diverticulosis.   2. Pancreatic cysts. A 1.7 cm cyst in the tail of pancreas in mildly complex. MRCP may be considered for further characterization and ruling out other lesions. Biopsy may be needed to exclude high-grade dysplasia or neoplasia.  3. Suspected hepatic steatosis. Status post-cholecystectomy without significant biliary dilatation.   4. Pulmonary nodules, up to 7 mm. Consider baseline chest CT.  5. Cardiomegaly.    FHx: Prostate Cancer (all 3 brothers) Lymphoma: Oldest brother (91 y/o)  PSHx: Cholecystectomy (30 years ago)  PMHx: Uterine Cancer - dx 11 yrs ago Atrial Fibrillation HTN GERD Diverticulitis Triglyceridemia  Sleep apnea   Medications: Pantoprazole 40 mg 1/day Xarelto 20 mg 1/day Metoprolol 50 mg 1/day Buproprion 300 mg Gabapentin 800 mg Metformin 500 mg Allopurinol 100 mg Losartan 100 mg 1/day Metoprolol 25 mg 1/day Simvastatin 20 mg   endometrial cancer s/p robotic GEMINI/BSO 2013, then chemo and radiation, at [facility] [place]  open cholecystectomy 50 years ago

## 2024-08-07 NOTE — HISTORY OF PRESENT ILLNESS
[de-identified] : ALFREDA RODRIGUEZ  is a pleasant 83 year old woman  who came in 08/06/2024 for panc cyst . She has Dr SUZIE DIAZ as Her PCP.  she was sent by dr Moreno s/p left lower abdominal pain s/p diverticulitis, she saw dr Moreno for this as her GO[employer] retired, she had CT to rule out diverticulitis but she found to have panc cyst, MRI done and showed risk features     MRCP With & Without Intravenous Contrast Examination date: 06/27/2024  Impression:  1. 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 Worrisome features, mural nodule, is found in two of the lesions, measuring 18 mm in the tall and 13 mm in the neck of the pancreas. Consider aspiration biopsy/surgical consultation.   2. Significant hepatic steatosis.    OT Abdomen and Pelvis with Contrast Examination Date: 06/19/2024  Impression: 1. Mild uncomplicated diverticulitis of the distal descending colon. Background of pancolonic diverticulosis.   2. Pancreatic cysts. A 1.7 cm cyst in the tail of pancreas in mildly complex. MRCP may be considered for further characterization and ruling out other lesions. Biopsy may be needed to exclude high-grade dysplasia or neoplasia.  3. Suspected hepatic steatosis. Status post-cholecystectomy without significant biliary dilatation.   4. Pulmonary nodules, up to 7 mm. Consider baseline chest CT.  5. Cardiomegaly.    FHx: Prostate Cancer (all 3 brothers) Lymphoma: Oldest brother (89 y/o)  PSHx: Cholecystectomy (30 years ago)  PMHx: Uterine Cancer - dx 11 yrs ago Atrial Fibrillation HTN GERD Diverticulitis Triglyceridemia  Sleep apnea   Medications: Pantoprazole 40 mg 1/day Xarelto 20 mg 1/day Metoprolol 50 mg 1/day Buproprion 300 mg Gabapentin 800 mg Metformin 500 mg Allopurinol 100 mg Losartan 100 mg 1/day Metoprolol 25 mg 1/day Simvastatin 20 mg   endometrial cancer s/p robotic GEMINI/BSO 2013, then chemo and radiation, at [facility] [place]  open cholecystectomy 50 years ago

## 2024-08-07 NOTE — ASSESSMENT
[FreeTextEntry1] : OSBALDO RODRIGUEZ  is a pleasant 83 year old woman  who came in 08/06/2024 for panc cyst . She has Dr SUZIE DIAZ as Her PCP.  she was sent by dr Moreno s/p left lower abdominal pain s/p diverticulitis, she saw dr Moreno for this as her [employer] retired, she had CT to rule out diverticulitis but she found to have panc cyst, MRI done and showed risk features     MRCP With & Without Intravenous Contrast Examination date: 06/27/2024  Impression:  1. 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 Worrisome features, mural nodule, is found in two of the lesions, measuring 18 mm in the tall and 13 mm in the neck of the pancreas. Consider aspiration biopsy/surgical consultation.   2. Significant hepatic steatosis.    OT Abdomen and Pelvis with Contrast Examination Date: 06/19/2024  Impression: 1. Mild uncomplicated diverticulitis of the distal descending colon. Background of pancolonic diverticulosis.   2. Pancreatic cysts. A 1.7 cm cyst in the tail of pancreas in mildly complex. MRCP may be considered for further characterization and ruling out other lesions. Biopsy may be needed to exclude high-grade dysplasia or neoplasia.  3. Suspected hepatic steatosis. Status post-cholecystectomy without significant biliary dilatation.   4. Pulmonary nodules, up to 7 mm. Consider baseline chest CT.  5. Cardiomegaly.    FHx: Prostate Cancer (all 3 brothers) Lymphoma: Oldest brother (91 y/o)  PSHx: Cholecystectomy (30 years ago)  PMHx: Uterine Cancer - dx 11 yrs ago Atrial Fibrillation HTN GERD Diverticulitis Triglyceridemia  Sleep apnea   Medications: Pantoprazole 40 mg 1/day Xarelto 20 mg 1/day Metoprolol 50 mg 1/day Buproprion 300 mg Gabapentin 800 mg Metformin 500 mg Allopurinol 100 mg Losartan 100 mg 1/day Metoprolol 25 mg 1/day Simvastatin 20 mg   endometrial cancer s/p robotic GEMINI/BSO 2013, then chemo and radiation, at [facility] [place]  open cholecystectomy 50 years ago  she has appt august 14 with dr Berger  for eval for EUS  will send for ca 19-9  will plan for vaccination for possible splenectomy as she [place] probably need distal panc for this cyst with mural nodule still need to review at our GI tumor board    the above plan of care with discussed in details to the patient and all questions were answered to patient satisfaction. patient instructed to follow up with the referring physician and patient primary care provider   A total of 80 minutes was spent on this visit, obtaining h/p,  reviewing previous notes/imaging, counseling the patient on coming procedure and f/u , ordering tests (below), and documenting the findings in the note.

## 2024-08-07 NOTE — ASSESSMENT
[FreeTextEntry1] : OSBALDO RODRIGUEZ  is a pleasant 83 year old woman  who came in 08/06/2024 for panc cyst . She has Dr SUZIE DIAZ as Her PCP.  she was sent by dr Moreno s/p left lower abdominal pain s/p diverticulitis, she saw dr Moreno for this as her [employer] retired, she had CT to rule out diverticulitis but she found to have panc cyst, MRI done and showed risk features     MRCP With & Without Intravenous Contrast Examination date: 06/27/2024  Impression:  1. 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 Worrisome features, mural nodule, is found in two of the lesions, measuring 18 mm in the tall and 13 mm in the neck of the pancreas. Consider aspiration biopsy/surgical consultation.   2. Significant hepatic steatosis.    OT Abdomen and Pelvis with Contrast Examination Date: 06/19/2024  Impression: 1. Mild uncomplicated diverticulitis of the distal descending colon. Background of pancolonic diverticulosis.   2. Pancreatic cysts. A 1.7 cm cyst in the tail of pancreas in mildly complex. MRCP may be considered for further characterization and ruling out other lesions. Biopsy may be needed to exclude high-grade dysplasia or neoplasia.  3. Suspected hepatic steatosis. Status post-cholecystectomy without significant biliary dilatation.   4. Pulmonary nodules, up to 7 mm. Consider baseline chest CT.  5. Cardiomegaly.    FHx: Prostate Cancer (all 3 brothers) Lymphoma: Oldest brother (89 y/o)  PSHx: Cholecystectomy (30 years ago)  PMHx: Uterine Cancer - dx 11 yrs ago Atrial Fibrillation HTN GERD Diverticulitis Triglyceridemia  Sleep apnea   Medications: Pantoprazole 40 mg 1/day Xarelto 20 mg 1/day Metoprolol 50 mg 1/day Buproprion 300 mg Gabapentin 800 mg Metformin 500 mg Allopurinol 100 mg Losartan 100 mg 1/day Metoprolol 25 mg 1/day Simvastatin 20 mg   endometrial cancer s/p robotic GEMINI/BSO 2013, then chemo and radiation, at [facility] [place]  open cholecystectomy 50 years ago  she has appt august 14 with dr Berger  for eval for EUS  will send for ca 19-9  will plan for vaccination for possible splenectomy as she violette probably need distal panc for this cyst with mural nodule still need to review at our GI tumor board    the above plan of care with discussed in details to the patient and all questions were answered to patient satisfaction. patient instructed to follow up with the referring physician and patient primary care provider   A total of 80 minutes was spent on this visit, obtaining h/p,  reviewing previous notes/imaging, counseling the patient on coming procedure and f/u , ordering tests (below), and documenting the findings in the note.

## 2024-08-14 ENCOUNTER — APPOINTMENT (OUTPATIENT)
Dept: GASTROENTEROLOGY | Facility: CLINIC | Age: 84
End: 2024-08-14

## 2024-08-14 VITALS — HEIGHT: 66 IN | BODY MASS INDEX: 32.78 KG/M2 | WEIGHT: 204 LBS

## 2024-08-14 DIAGNOSIS — Z78.9 OTHER SPECIFIED HEALTH STATUS: ICD-10-CM

## 2024-08-14 DIAGNOSIS — Z86.39 PERSONAL HISTORY OF OTHER ENDOCRINE, NUTRITIONAL AND METABOLIC DISEASE: ICD-10-CM

## 2024-08-14 DIAGNOSIS — Z87.39 PERSONAL HISTORY OF OTHER DISEASES OF THE MUSCULOSKELETAL SYSTEM AND CONNECTIVE TISSUE: ICD-10-CM

## 2024-08-14 DIAGNOSIS — Z85.42 PERSONAL HISTORY OF MALIGNANT NEOPLASM OF OTHER PARTS OF UTERUS: ICD-10-CM

## 2024-08-14 DIAGNOSIS — Z87.898 PERSONAL HISTORY OF OTHER SPECIFIED CONDITIONS: ICD-10-CM

## 2024-08-14 DIAGNOSIS — Z86.79 PERSONAL HISTORY OF OTHER DISEASES OF THE CIRCULATORY SYSTEM: ICD-10-CM

## 2024-08-14 DIAGNOSIS — K57.90 DIVERTICULOSIS OF INTESTINE, PART UNSPECIFIED, W/OUT PERFORATION OR ABSCESS W/OUT BLEEDING: ICD-10-CM

## 2024-08-14 DIAGNOSIS — Z87.19 PERSONAL HISTORY OF OTHER DISEASES OF THE DIGESTIVE SYSTEM: ICD-10-CM

## 2024-08-14 DIAGNOSIS — Z87.09 PERSONAL HISTORY OF OTHER DISEASES OF THE RESPIRATORY SYSTEM: ICD-10-CM

## 2024-08-14 DIAGNOSIS — Z86.69 PERSONAL HISTORY OF OTHER DISEASES OF THE NERVOUS SYSTEM AND SENSE ORGANS: ICD-10-CM

## 2024-08-14 DIAGNOSIS — K86.2 CYST OF PANCREAS: ICD-10-CM

## 2024-08-14 DIAGNOSIS — Z63.4 DISAPPEARANCE AND DEATH OF FAMILY MEMBER: ICD-10-CM

## 2024-08-14 PROCEDURE — 99203 OFFICE O/P NEW LOW 30 MIN: CPT

## 2024-08-14 RX ORDER — VITAMIN B COMPLEX
CAPSULE ORAL
Refills: 0 | Status: ACTIVE | COMMUNITY

## 2024-08-14 RX ORDER — LOSARTAN POTASSIUM 100 MG/1
100 TABLET, FILM COATED ORAL
Refills: 0 | Status: ACTIVE | COMMUNITY

## 2024-08-14 RX ORDER — METFORMIN ER 500 MG 500 MG/1
500 TABLET ORAL
Refills: 0 | Status: ACTIVE | COMMUNITY

## 2024-08-14 RX ORDER — SIMVASTATIN 20 MG/1
20 TABLET, FILM COATED ORAL
Refills: 0 | Status: ACTIVE | COMMUNITY

## 2024-08-14 RX ORDER — ALLOPURINOL 100 MG/1
100 TABLET ORAL
Refills: 0 | Status: ACTIVE | COMMUNITY

## 2024-08-14 RX ORDER — CIPROFLOXACIN HYDROCHLORIDE 500 MG/1
500 TABLET, FILM COATED ORAL TWICE DAILY
Qty: 10 | Refills: 0 | Status: ACTIVE | COMMUNITY
Start: 2024-08-14 | End: 1900-01-01

## 2024-08-14 RX ORDER — RIVAROXABAN 20 MG/1
20 TABLET, FILM COATED ORAL
Refills: 0 | Status: ACTIVE | COMMUNITY

## 2024-08-14 RX ORDER — PANTOPRAZOLE 40 MG/1
40 TABLET, DELAYED RELEASE ORAL
Refills: 0 | Status: ACTIVE | COMMUNITY

## 2024-08-14 RX ORDER — METOPROLOL SUCCINATE 100 MG/1
100 TABLET, EXTENDED RELEASE ORAL
Refills: 0 | Status: ACTIVE | COMMUNITY

## 2024-08-14 RX ORDER — ERGOCALCIFEROL 1.25 MG/1
CAPSULE ORAL
Refills: 0 | Status: ACTIVE | COMMUNITY

## 2024-08-14 RX ORDER — GABAPENTIN 800 MG/1
800 TABLET, COATED ORAL
Refills: 0 | Status: ACTIVE | COMMUNITY

## 2024-08-14 SDOH — SOCIAL STABILITY - SOCIAL INSECURITY: DISSAPEARANCE AND DEATH OF FAMILY MEMBER: Z63.4

## 2024-08-15 PROBLEM — Z87.898 HISTORY OF HEARTBURN: Status: RESOLVED | Noted: 2024-08-14 | Resolved: 2024-08-15

## 2024-08-15 PROBLEM — Z87.19 HISTORY OF IRRITABLE BOWEL SYNDROME: Status: RESOLVED | Noted: 2024-08-14 | Resolved: 2024-08-15

## 2024-08-15 PROBLEM — Z87.19 HISTORY OF HIATAL HERNIA: Status: RESOLVED | Noted: 2024-08-14 | Resolved: 2024-08-15

## 2024-08-15 PROBLEM — Z85.42 HISTORY OF MALIGNANT NEOPLASM OF UTERUS: Status: RESOLVED | Noted: 2024-08-14 | Resolved: 2024-08-15

## 2024-08-15 PROBLEM — Z87.19 HISTORY OF HEMORRHOIDS: Status: RESOLVED | Noted: 2024-08-14 | Resolved: 2024-08-15

## 2024-08-15 PROBLEM — Z87.39 HISTORY OF GOUT: Status: RESOLVED | Noted: 2024-08-14 | Resolved: 2024-08-15

## 2024-08-15 PROBLEM — Z86.39 HISTORY OF HYPERLIPIDEMIA: Status: RESOLVED | Noted: 2024-08-14 | Resolved: 2024-08-15

## 2024-08-15 PROBLEM — Z86.79 HISTORY OF HYPERTENSION: Status: RESOLVED | Noted: 2024-08-14 | Resolved: 2024-08-15

## 2024-08-15 PROBLEM — Z86.69 HISTORY OF SLEEP APNEA: Status: RESOLVED | Noted: 2024-08-14 | Resolved: 2024-08-15

## 2024-08-15 PROBLEM — Z87.09 HISTORY OF SINUSITIS: Status: RESOLVED | Noted: 2024-08-14 | Resolved: 2024-08-15

## 2024-08-15 NOTE — PHYSICAL EXAM
[Alert] : alert [Normal Voice/Communication] : normal voice/communication [No Acute Distress] : no acute distress [Well Developed] : well developed [Obese (BMI >= 30)] : obese (BMI >= 30) [Sclera] : the sclera and conjunctiva were normal [Hearing Threshold Finger Rub Not [place]] : hearing was normal [Normal Lips/Gums] : the lips and gums were normal [No Respiratory Distress] : no respiratory distress [Normal Appearance] : the appearance of the neck was normal [No Acc Muscle Use] : no accessory muscle use [Respiration, Rhythm And Depth] : normal respiratory rhythm and effort [Heart Rate And Rhythm] : heart rate was normal and rhythm regular [Normal S1, S2] : normal S1 and S2 [Bowel Sounds] : normal bowel sounds [Abdomen Tenderness] : non-tender [No Masses] : no abdominal mass palpated [Abdomen Soft] : soft [Abnormal Walk] : normal gait [Normal Color / Pigmentation] : normal skin color and pigmentation [Oriented To Time, Place, And Person] : oriented to person, place, and time

## 2024-08-15 NOTE — PHYSICAL EXAM
[Alert] : alert [Normal Voice/Communication] : normal voice/communication [No Acute Distress] : no acute distress [Well Developed] : well developed [Obese (BMI >= 30)] : obese (BMI >= 30) [Sclera] : the sclera and conjunctiva were normal [Hearing Threshold Finger Rub Not [place]] : hearing was normal [Normal Lips/Gums] : the lips and gums were normal [Normal Appearance] : the appearance of the neck was normal [No Respiratory Distress] : no respiratory distress [No Acc Muscle Use] : no accessory muscle use [Respiration, Rhythm And Depth] : normal respiratory rhythm and effort [Heart Rate And Rhythm] : heart rate was normal and rhythm regular [Normal S1, S2] : normal S1 and S2 [Bowel Sounds] : normal bowel sounds [Abdomen Tenderness] : non-tender [No Masses] : no abdominal mass palpated [Abdomen Soft] : soft [Abnormal Walk] : normal gait [Normal Color / Pigmentation] : normal skin color and pigmentation [Oriented To Time, Place, And Person] : oriented to person, place, and time

## 2024-08-15 NOTE — ASSESSMENT
[FreeTextEntry1] : 83 yr old female with H/O Atrial Fibrillation (on xarelto),HTN, HLD, KYLE, H/O Uterine cancer (S/P GEMINI with Chemo and RT, GERD, IBS, H/O Diverticulitis, ), was referred here by Dr. Moreno for an EUS with FNB of pancreatic cysts. She originally had a CT scan of the abdomen due to suspected diverticulitis and was found to have pancreatic cysts. MRCP was recommended and done 6/27/24 which showed several pancreatic cysts C/W IPMNs but 2 with worrisome features, 1 with a mural nodule so she was referred to Surgeon Dr. Grider and here for an EUS with FNA. Dr. Grider is planning possible distal pancreatectomy with splenectomy. She takes pantoprazole OD with good control of GERD. She denied abdominal pain, vomiting, weight loss, dysphagia, constipation, diarrhea, or blood in the stool.     Pancreatic cysts, 2 with worrisome features, R/O malignancy - Will schedule an EUS with FNB; risks and benefits discussed - She was told to hold the xarelto x 3 days before the procedure; she was told to check with her Cardiologist to make sure that it is safe to have the procedure and hold the xarelto. - F/U after procedure is done - Cipro x 5 days post procedure - F/U after procedure is done - F/U with Dr. Grider

## 2024-08-15 NOTE — HISTORY OF PRESENT ILLNESS
[FreeTextEntry1] : 83 yr old female with H/O Atrial Fibrillation (on xarelto),HTN, HLD, KYLE, H/O Uterine cancer (S/P GEMINI with Chemo and RT, GERD, IBS, H/O Diverticulitis, ), was referred here by Dr. Moreno for an EUS with FNB of pancreatic cysts. She originally had a CT scan of the abdomen due to suspected diverticulitis and was found to have pancreatic cysts. MRCP was recommended and done 6/27/24 which showed several pancreatic cysts C/W IPMNs but 2 with worrisome features, 1 with a mural nodule so she was referred to Surgeon Dr. Grider and here for an EUS with FNA. Dr. Grider is planning possible distal pancreatectomy with splenectomy. She takes pantoprazole OD with good control of GERD. She denied abdominal pain, vomiting, weight loss, dysphagia, constipation, diarrhea, or blood in the stool.

## 2024-08-27 ENCOUNTER — APPOINTMENT (OUTPATIENT)
Dept: SURGERY | Facility: CLINIC | Age: 84
End: 2024-08-27
Payer: MEDICARE

## 2024-08-27 VITALS
DIASTOLIC BLOOD PRESSURE: 74 MMHG | HEIGHT: 66 IN | HEART RATE: 116 BPM | BODY MASS INDEX: 32.95 KG/M2 | WEIGHT: 205 LBS | SYSTOLIC BLOOD PRESSURE: 124 MMHG | OXYGEN SATURATION: 96 %

## 2024-08-27 DIAGNOSIS — K86.2 CYST OF PANCREAS: ICD-10-CM

## 2024-08-27 PROCEDURE — 99214 OFFICE O/P EST MOD 30 MIN: CPT

## 2024-08-27 NOTE — HISTORY OF PRESENT ILLNESS
[de-identified] : ALFREDA RODRIGUEZ  is a pleasant 83 year old woman  who came in 08/06/2024 for panc cyst . She has Dr SUZIE DIAZ as Her PCP.  she was sent by dr Moreno s/p left lower abdominal pain s/p diverticulitis, she saw dr Moreno for this as her [employer] retired, she had CT to rule out diverticulitis but she found to have panc cyst, MRI done and showed risk features     MRCP With & Without Intravenous Contrast Examination date: 06/27/2024  Impression:  1. 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 Worrisome features, mural nodule, is found in two of the lesions, measuring 18 mm in the tall and 13 mm in the neck of the pancreas. Consider aspiration biopsy/surgical consultation.   2. Significant hepatic steatosis.    OT Abdomen and Pelvis with Contrast Examination Date: 06/19/2024  Impression: 1. Mild uncomplicated diverticulitis of the distal descending colon. Background of pancolonic diverticulosis.   2. Pancreatic cysts. A 1.7 cm cyst in the tail of pancreas in mildly complex. MRCP may be considered for further characterization and ruling out other lesions. Biopsy may be needed to exclude high-grade dysplasia or neoplasia.  3. Suspected hepatic steatosis. Status post-cholecystectomy without significant biliary dilatation.   4. Pulmonary nodules, up to 7 mm. Consider baseline chest CT.  5. Cardiomegaly.    FHx: Prostate Cancer (all 3 brothers) Lymphoma: Oldest brother (89 y/o)  PSHx: Cholecystectomy (30 years ago)  PMHx: Uterine Cancer - dx 11 yrs ago Atrial Fibrillation HTN GERD Diverticulitis Triglyceridemia  Sleep apnea   Medications: Pantoprazole 40 mg 1/day Xarelto 20 mg 1/day Metoprolol 50 mg 1/day Buproprion 300 mg Gabapentin 800 mg Metformin 500 mg Allopurinol 100 mg Losartan 100 mg 1/day Metoprolol 25 mg 1/day Simvastatin 20 mg   endometrial cancer s/p robotic GEMINI/BSO 2013, then chemo and radiation, at [facility] [place]  open cholecystectomy 50 years ago

## 2024-08-27 NOTE — ASSESSMENT
[FreeTextEntry1] : OSBALDO RODRIGUEZ  is a pleasant 83 year old woman  who came in 08/06/2024 for panc cyst . She has Dr SUZIE DIAZ as Her PCP.  she was sent by dr Moreno s/p left lower abdominal pain s/p diverticulitis, she saw dr Moreno for this as her [employer] retired, she had CT to rule out diverticulitis but she found to have panc cyst, MRI done and showed risk features     MRCP With & Without Intravenous Contrast Examination date: 06/27/2024  Impression:  1. 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 Worrisome features, mural nodule, is found in two of the lesions, measuring 18 mm in the tall and 13 mm in the neck of the pancreas. Consider aspiration biopsy/surgical consultation.   2. Significant hepatic steatosis.    OT Abdomen and Pelvis with Contrast Examination Date: 06/19/2024  Impression: 1. Mild uncomplicated diverticulitis of the distal descending colon. Background of pancolonic diverticulosis.   2. Pancreatic cysts. A 1.7 cm cyst in the tail of pancreas in mildly complex. MRCP may be considered for further characterization and ruling out other lesions. Biopsy may be needed to exclude high-grade dysplasia or neoplasia.  3. Suspected hepatic steatosis. Status post-cholecystectomy without significant biliary dilatation.   4. Pulmonary nodules, up to 7 mm. Consider baseline chest CT.  5. Cardiomegaly.    FHx: Prostate Cancer (all 3 brothers) Lymphoma: Oldest brother (89 y/o)  PSHx: Cholecystectomy (30 years ago)  PMHx: Uterine Cancer - dx 11 yrs ago Atrial Fibrillation HTN GERD Diverticulitis Triglyceridemia  Sleep apnea   Medications: Pantoprazole 40 mg 1/day Xarelto 20 mg 1/day Metoprolol 50 mg 1/day Buproprion 300 mg Gabapentin 800 mg Metformin 500 mg Allopurinol 100 mg Losartan 100 mg 1/day Metoprolol 25 mg 1/day Simvastatin 20 mg   endometrial cancer s/p robotic GEMINI/BSO 2013, then [place] and [place] [facility] [place]  open cholecystectomy 50 years ago  she has appt august 14 with dr Berger  for eval for EUS  will send for ca 19-9  will plan for vaccination for possible splenectomy as she violet[place] probably need distal panc for this cyst with mural nodule still need to review at our GI tumor board  08/27/2024 : no new reported symptoms, exam is unchanged, need CA 19-9, waiting for EUS sept 5, sending for vaccination in preparation for possible splenectomy     the above plan of care with discussed in details to the patient and all questions were answered to patient satisfaction. patient instructed to follow up with the referring physician and patient primary care provider   A total of 30 minutes was spent on this visit, obtaining h/p,  reviewing previous notes/imaging, counseling the patient on coming procedure and f/u , ordering tests (below), and documenting the findings in the note.

## 2024-09-04 RX ORDER — METRONIDAZOLE 500 MG/1
500 TABLET ORAL 3 TIMES DAILY
Qty: 42 | Refills: 0 | Status: ACTIVE | COMMUNITY
Start: 2024-09-04 | End: 1900-01-01

## 2024-09-04 RX ORDER — CIPROFLOXACIN HYDROCHLORIDE 250 MG/1
250 TABLET, FILM COATED ORAL
Qty: 28 | Refills: 0 | Status: ACTIVE | COMMUNITY
Start: 2024-09-04 | End: 1900-01-01

## 2024-09-12 ENCOUNTER — APPOINTMENT (OUTPATIENT)
Dept: SURGERY | Facility: CLINIC | Age: 84
End: 2024-09-12

## 2024-09-16 ENCOUNTER — TRANSCRIPTION ENCOUNTER (OUTPATIENT)
Age: 84
End: 2024-09-16

## 2024-09-16 ENCOUNTER — OUTPATIENT (OUTPATIENT)
Dept: OUTPATIENT SERVICES | Facility: HOSPITAL | Age: 84
LOS: 1 days | Discharge: HOME | End: 2024-09-16
Payer: MEDICARE

## 2024-09-16 ENCOUNTER — RESULT REVIEW (OUTPATIENT)
Age: 84
End: 2024-09-16

## 2024-09-16 VITALS
RESPIRATION RATE: 18 BRPM | HEART RATE: 56 BPM | DIASTOLIC BLOOD PRESSURE: 67 MMHG | SYSTOLIC BLOOD PRESSURE: 130 MMHG | OXYGEN SATURATION: 98 %

## 2024-09-16 VITALS
HEIGHT: 66 IN | HEART RATE: 81 BPM | OXYGEN SATURATION: 97 % | SYSTOLIC BLOOD PRESSURE: 169 MMHG | DIASTOLIC BLOOD PRESSURE: 111 MMHG | WEIGHT: 199.96 LBS | TEMPERATURE: 96 F | RESPIRATION RATE: 18 BRPM

## 2024-09-16 DIAGNOSIS — K86.2 CYST OF PANCREAS: ICD-10-CM

## 2024-09-16 DIAGNOSIS — Z90.49 ACQUIRED ABSENCE OF OTHER SPECIFIED PARTS OF DIGESTIVE TRACT: Chronic | ICD-10-CM

## 2024-09-16 PROCEDURE — 88312 SPECIAL STAINS GROUP 1: CPT | Mod: 26

## 2024-09-16 PROCEDURE — 88342 IMHCHEM/IMCYTCHM 1ST ANTB: CPT | Mod: 26

## 2024-09-16 PROCEDURE — 88341 IMHCHEM/IMCYTCHM EA ADD ANTB: CPT | Mod: 26

## 2024-09-16 PROCEDURE — 88312 SPECIAL STAINS GROUP 1: CPT

## 2024-09-16 PROCEDURE — 43237 ENDOSCOPIC US EXAM ESOPH: CPT

## 2024-09-16 PROCEDURE — 88305 TISSUE EXAM BY PATHOLOGIST: CPT

## 2024-09-16 PROCEDURE — 88172 CYTP DX EVAL FNA 1ST EA SITE: CPT | Mod: 26

## 2024-09-16 PROCEDURE — 43239 EGD BIOPSY SINGLE/MULTIPLE: CPT | Mod: XU

## 2024-09-16 PROCEDURE — 88173 CYTOPATH EVAL FNA REPORT: CPT | Mod: 26

## 2024-09-16 PROCEDURE — 88305 TISSUE EXAM BY PATHOLOGIST: CPT | Mod: 26

## 2024-09-16 PROCEDURE — 88305 TISSUE EXAM BY PATHOLOGIST: CPT | Mod: 26,XP

## 2024-09-16 PROCEDURE — 88172 CYTP DX EVAL FNA 1ST EA SITE: CPT

## 2024-09-16 PROCEDURE — 88342 IMHCHEM/IMCYTCHM 1ST ANTB: CPT

## 2024-09-16 PROCEDURE — 88173 CYTOPATH EVAL FNA REPORT: CPT

## 2024-09-16 PROCEDURE — 88341 IMHCHEM/IMCYTCHM EA ADD ANTB: CPT

## 2024-09-16 RX ORDER — PANTOPRAZOLE SODIUM 40 MG
40 TABLET, DELAYED RELEASE (ENTERIC COATED) ORAL
Refills: 0 | DISCHARGE

## 2024-09-16 RX ORDER — PANTOPRAZOLE SODIUM 40 MG
1 TABLET, DELAYED RELEASE (ENTERIC COATED) ORAL
Refills: 0 | DISCHARGE

## 2024-09-16 RX ORDER — LOSARTAN POTASSIUM 50 MG/1
1 TABLET ORAL
Refills: 0 | DISCHARGE

## 2024-09-16 RX ORDER — METFORMIN HYDROCHLORIDE 850 MG/1
1 TABLET, FILM COATED ORAL
Refills: 0 | DISCHARGE

## 2024-09-16 RX ORDER — SIMVASTATIN 10 MG
1 TABLET ORAL
Refills: 0 | DISCHARGE

## 2024-09-16 NOTE — CHART NOTE - NSCHARTNOTEFT_GEN_A_CORE
PACU ANESTHESIA ADMISSION NOTE      Procedure:   Post op diagnosis:      ____  Intubated  TV:______       Rate: ______      FiO2: ______    _x___  Patent Airway    _x___  Full return of protective reflexes    _x___  Full recovery from anesthesia / back to baseline status    Vitals:  T(C): 35.8 (09-16-24 @ 10:48), Max: 35.8 (09-16-24 @ 07:45)  HR: 56 (09-16-24 @ 11:18) (56 - 90)  BP: 130/67 (09-16-24 @ 11:18) (116/71 - 169/111)  RR: 18 (09-16-24 @ 11:18) (17 - 19)  SpO2: 98% (09-16-24 @ 11:18) (97% - 100%)    Mental Status:  _x___ Awake   _____ Alert   _____ Drowsy   _____ Sedated    Nausea/Vomiting:  _x___  NO       ______Yes,   See Post - Op Orders         Pain Scale (0-10):  __0___    Treatment: _x___ None    ____ See Post - Op/PCA Orders    Post - Operative Fluids:   __x__ Oral   ____ See Post - Op Orders    Plan: Discharge:   _x___Home       _____Floor     _____Critical Care    _____  Other:_________________    Comments:  No anesthesia issues or complications noted.  Discharge when criteria met.

## 2024-09-16 NOTE — PRE-ANESTHESIA EVALUATION ADULT - NSANTHPMHFT_GEN_ALL_CORE
9/12/2023 Dora is an 84-year-old female referred by Dr. Channing Millan for consultation of Hemoccult positive stool.  She was admitted to [facility] in July with DKA.  During her admission she had some loose stools, her stool studies were negative however she was Hemoccult positive.  Her hemoglobin was normal at 11.8.  Her last colonoscopy was in 2018 by Dr. Osborne with likely infectious colitis, she had had a normal colonoscopy in 2008 by Dr. Osborne.  She does not take anything for her bowels.  She had no further diarrhea post her DKA admission.  She denies any rectal bleeding or melena.  She is on Brilinta with a history of coronary disease status post stent placement over 2 years ago by Dr. Wright.  Today she is doing well otherwise with no new GI complaints.  We will schedule colonoscopy given Hemoccult positive stool.  MB 
DM  HTN  DLD  GERD

## 2024-09-18 LAB — SURGICAL PATHOLOGY STUDY: SIGNIFICANT CHANGE UP

## 2024-09-19 DIAGNOSIS — K21.9 GASTRO-ESOPHAGEAL REFLUX DISEASE WITHOUT ESOPHAGITIS: ICD-10-CM

## 2024-09-19 DIAGNOSIS — Z79.01 LONG TERM (CURRENT) USE OF ANTICOAGULANTS: ICD-10-CM

## 2024-09-19 DIAGNOSIS — E11.9 TYPE 2 DIABETES MELLITUS WITHOUT COMPLICATIONS: ICD-10-CM

## 2024-09-19 DIAGNOSIS — I10 ESSENTIAL (PRIMARY) HYPERTENSION: ICD-10-CM

## 2024-09-19 DIAGNOSIS — Z88.5 ALLERGY STATUS TO NARCOTIC AGENT: ICD-10-CM

## 2024-09-19 DIAGNOSIS — Z79.84 LONG TERM (CURRENT) USE OF ORAL HYPOGLYCEMIC DRUGS: ICD-10-CM

## 2024-09-19 DIAGNOSIS — Z88.2 ALLERGY STATUS TO SULFONAMIDES: ICD-10-CM

## 2024-09-19 DIAGNOSIS — K86.2 CYST OF PANCREAS: ICD-10-CM

## 2024-09-19 DIAGNOSIS — I48.20 CHRONIC ATRIAL FIBRILLATION, UNSPECIFIED: ICD-10-CM

## 2024-09-19 DIAGNOSIS — K29.50 UNSPECIFIED CHRONIC GASTRITIS WITHOUT BLEEDING: ICD-10-CM

## 2024-09-19 DIAGNOSIS — E78.5 HYPERLIPIDEMIA, UNSPECIFIED: ICD-10-CM

## 2024-09-19 LAB — NON-GYNECOLOGICAL CYTOLOGY STUDY: SIGNIFICANT CHANGE UP

## 2024-09-25 ENCOUNTER — APPOINTMENT (OUTPATIENT)
Dept: GASTROENTEROLOGY | Facility: CLINIC | Age: 84
End: 2024-09-25
Payer: MEDICARE

## 2024-09-25 DIAGNOSIS — Z78.9 OTHER SPECIFIED HEALTH STATUS: ICD-10-CM

## 2024-09-25 DIAGNOSIS — K86.2 CYST OF PANCREAS: ICD-10-CM

## 2024-09-25 PROCEDURE — 99213 OFFICE O/P EST LOW 20 MIN: CPT

## 2024-09-25 NOTE — HISTORY OF PRESENT ILLNESS
[FreeTextEntry1] : Patient is a 82 y/o female with a PMHx of Atrial Fibrillation (on xarelto),HTN, HLD, KYLE, H/O Uterine cancer (S/P GEMINI with Chemo and RT, GERD, IBS, H/O Diverticulitis, ), was referred here by Dr. Moreno for an EUS with FNB of pancreatic cysts. She originally had a CT scan of the abdomen due to suspected diverticulitis and was found to have pancreatic cysts. MRCP was recommended and done 6/27/24 which showed several pancreatic cysts C/W IPMNs but 2 with worrisome features, 1 with a mural nodule so she was referred to Surgeon Dr. Grider . She is s/p EUS. She tolerated procedure well.                                      [de-identified] : 9/16/24

## 2024-09-25 NOTE — PHYSICAL EXAM
[Alert] : alert [Normal Voice/Communication] : normal voice/communication [No Acute Distress] : no acute distress [Well Developed] : well developed [Obese (BMI >= 30)] : obese (BMI >= 30) [Sclera] : the sclera and conjunctiva were normal [Hearing Threshold Finger Rub Not [place]] : hearing was normal [Normal Lips/Gums] : the lips and gums were normal [Normal Appearance] : the appearance of the neck was normal [No Respiratory Distress] : no respiratory distress [No Acc Muscle Use] : no accessory muscle use [Respiration, Rhythm And Depth] : normal respiratory rhythm and effort [Heart Rate And Rhythm] : heart rate was normal and rhythm regular [Normal S1, S2] : normal S1 and S2 [Bowel Sounds] : normal bowel sounds [Abdomen Tenderness] : non-tender [No Masses] : no abdominal mass palpated [Abdomen Soft] : soft [Abnormal Walk] : normal gait [Normal Color / Pigmentation] : normal skin color and pigmentation [Oriented To Time, Place, And Person] : oriented to person, place, and time

## 2024-09-25 NOTE — ASSESSMENT
[FreeTextEntry1] : Patient is a 84 y/o female with a PMHx of Atrial Fibrillation (on xarelto),HTN, HLD, KYLE, H/O Uterine cancer (S/P GEMINI with Chemo and RT, GERD, IBS, H/O Diverticulitis, ), was referred here by Dr. Moreno for an EUS with FNB of pancreatic cysts. She originally had a CT scan of the abdomen due to suspected diverticulitis and was found to have pancreatic cysts. MRCP was recommended and done 6/27/24 which showed several pancreatic cysts C/W IPMNs but 2 with worrisome features, 1 with a mural nodule so she was referred to Surgeon Dr. Grider . She is s/p EUS. She tolerated procedure well.   Pancreatic cysts, 2 with worrisome features, R/O malignancy - EUS discussed with patient, concern of pre malignancy - Encouraged follow up with Dr Grider for possible surgical intervention

## 2025-01-29 PROBLEM — I48.20 CHRONIC ATRIAL FIBRILLATION, UNSPECIFIED: Chronic | Status: ACTIVE | Noted: 2024-09-16

## 2025-03-11 NOTE — ED ADULT TRIAGE NOTE - CCCP TRG CHIEF CMPLNT
Physical Therapy      Patient Name:  Tobi LOZADA Shalonda Dumont.   MRN:  [id number]    Patient not seen today secondary to Blood transfusion. Will follow-up as appropriate.     anticoagulant/head injury

## 2025-05-12 NOTE — ED PROVIDER NOTE - WR INTERPRETATION 1
[TextEntry] : Post Operative Care:  Pt advised of importance of daily weights. Pt instructed on how to use incentive spirometer every hour, demonstrated proper use. Pt encouraged to ambulate as much as tolerated, avoiding extreme temperatures outdoors. Also advised to cleanse incisions daily with mild soap and water and to avoid lotions, powders, ointments or creams near or on the incision. Low salt, low fat diet encouraged and discussed. Pt advised to avoid heavy lifting or straining.     [facility] team will continue to follow up with pt status.  NP/CCC roles explained with pt understanding, contact information provided. Pt agrees to call with any questions, issues or concerns.  Worsening symptoms reviewed with patient understanding.      FOLLOW UP APPOINTMENTS:  CTS: 5/13  CARDIOLOGIST:  pt. will call to schedule appointment to be seen within 2 weeks   PCP: Pt encouraged to follow up within one month of discharge  CXR negative - No pneumothorax, No opacities, No free air

## 2025-06-23 ENCOUNTER — NON-APPOINTMENT (OUTPATIENT)
Age: 85
End: 2025-06-23

## 2025-06-25 ENCOUNTER — NON-APPOINTMENT (OUTPATIENT)
Age: 85
End: 2025-06-25

## 2025-06-25 ENCOUNTER — APPOINTMENT (OUTPATIENT)
Dept: GASTROENTEROLOGY | Facility: CLINIC | Age: 85
End: 2025-06-25
Payer: MEDICARE

## 2025-06-25 VITALS — BODY MASS INDEX: 33.2 KG/M2 | HEIGHT: 66 IN | WEIGHT: 206.6 LBS

## 2025-06-25 PROBLEM — R05.9 COUGH: Status: ACTIVE | Noted: 2025-06-25

## 2025-06-25 PROBLEM — K21.9 ACID REFLUX: Status: ACTIVE | Noted: 2025-06-25

## 2025-06-25 PROBLEM — R10.10 PAIN OF UPPER ABDOMEN: Status: ACTIVE | Noted: 2025-06-25

## 2025-06-25 PROCEDURE — 99214 OFFICE O/P EST MOD 30 MIN: CPT

## 2025-07-17 NOTE — ED ADULT NURSE NOTE - MODE OF DISCHARGE
Population Health Chart Review & Patient Outreach Details      Further Action Needed If Patient Returns Outreach:        Health Maintenance Due   Topic Date Due    Diabetic Eye Exam  2023    COVID-19 Vaccine ( season) Never done    Hemoglobin A1c  10/09/2024    Diabetes Urine Screening  2025    Lipid Panel  2025          Updates Requested / Reviewed:     []  Care Everywhere    []     []  External Sources ([facility], [facility], DIS, etc.)    [] [facility]   [] [facility]   [] Other:    []  Care Team Updated   []  Removed  or Duplicate Orders   []  Immunization Reconciliation Completed / Queried    [] [place]   [] [place]   [] [place]   [] [place]      Health Maintenance Topics Addressed and Outreach Outcomes / Actions Taken:             Breast Cancer Screening []  Mammogram Order Placed    []  Mammogram Screening Scheduled    []  External Records Requested & Care Team Updated if Applicable    []  External Records Uploaded & Care Team Updated if Applicable    []  Pt Declined Scheduling Mammogram    []  Pt Will Schedule with External Provider / Order Routed & Care Team Updated if Applicable              Cervical Cancer Screening []  Pap Smear Scheduled in Primary Care or OBGYN    []  External Records Requested & Care Team Updated if Applicable       []  External Records Uploaded, Care Team Updated, & History Updated if Applicable    []  Patient Declined Scheduling Pap Smear    []  Patient Will Schedule with External Provider & Care Team Updated if Applicable                  Colorectal Cancer Screening []  Colonoscopy Case Request / Referral / Home Test Order Placed    []  External Records Requested & Care Team Updated if Applicable    []  External Records Uploaded, Care Team Updated, & History Updated if Applicable    []  Patient Declined Completing Colon Cancer Screening    []  Patient Will Schedule with External Provider & Care Team Updated if Applicable    []  Fit Kit Mailed  (add the SmartPhrase under additional notes)    []  Reminded Patient to Complete Home Test                Diabetic Eye Exam []  Eye Exam Screening Order Placed    []  Eye Camera Scheduled or Optometry/Ophthalmology Referral Placed    []  External Records Requested & Care Team Updated if Applicable    []  External Records Uploaded, Care Team Updated, & History Updated if Applicable    []  Patient Declined Scheduling Eye Exam    []  Patient Will Schedule with External Provider & Care Team Updated if Applicable             Blood Pressure Control []  Primary Care Follow Up Visit Scheduled     []  Remote Blood Pressure Reading Captured    []  Patient Declined Remote Reading or Scheduling Appt - Escalated to PCP    []  Patient Will Call Back or Send Portal Message with Reading                 HbA1c & Other Labs []  Overdue Lab(s) Ordered    []  Overdue Lab(s) Scheduled    []  External Records Uploaded & Care Team Updated if Applicable    []  Primary Care Follow Up Visit Scheduled     []  Reminded Patient to Complete A1c Home Test    []  Patient Declined Scheduling Labs or Will Call Back to Schedule    []  Patient Will Schedule with External Provider / Order Routed, & Care Team Updated if Applicable           Primary Care Appointment []  Primary Care Appt Scheduled    []  Patient Declined Scheduling or Will Call Back to Schedule    []  Pt Established with External Provider, Updated Care Team, & Informed Pt to Notify Payor if Applicable           Medication Adherence /    Statin Use []  Primary Care Appointment Scheduled    []  Patient Reminded to  Prescription    []  Patient Declined, Provider Notified if Needed    []  Sent Provider Message to Review to Evaluate Pt for Statin, Add Exclusion Dx Codes, Document   Exclusion in Problem List, Change Statin Intensity Level to Moderate or High Intensity if Applicable                Osteoporosis Screening []  Dexa Order Placed    []  Dexa Appointment Scheduled    []   Ambulatory External Records Requested & Care Team Updated    []  External Records Uploaded, Care Team Updated, & History Updated if Applicable    []  Patient Declined Scheduling Dexa or Will Call Back to Schedule    []  Patient Will Schedule with External Provider / Order Routed & Care Team Updated if Applicable       Additional Notes:       Post visit Population Health review of encounter with date of service  7/16/25 with Yue.  Not all required HY components in encounter.  Future labs due 7/30/25 message sent.  Followup appt for: 7/16/25 HY  7/16/25 HY future labs due 7/30/25  Received: Today  Nathaly Ny LPN P Lafferty Jennifer P Staff  Patient failed to have labs drawn at 7/16/25 HY visit.  Please contact patient to return prior to end of day 7/30/25 to avoid claim denial.  Nathaly Bueno

## 2025-07-18 ENCOUNTER — NON-APPOINTMENT (OUTPATIENT)
Age: 85
End: 2025-07-18

## 2025-07-30 ENCOUNTER — APPOINTMENT (OUTPATIENT)
Dept: PULMONOLOGY | Facility: CLINIC | Age: 85
End: 2025-07-30
Payer: MEDICARE

## 2025-07-30 VITALS
WEIGHT: 196 LBS | SYSTOLIC BLOOD PRESSURE: 108 MMHG | BODY MASS INDEX: 31.5 KG/M2 | HEIGHT: 66 IN | RESPIRATION RATE: 15 BRPM | DIASTOLIC BLOOD PRESSURE: 70 MMHG | HEART RATE: 94 BPM | OXYGEN SATURATION: 99 %

## 2025-07-30 DIAGNOSIS — R91.1 SOLITARY PULMONARY NODULE: ICD-10-CM

## 2025-07-30 DIAGNOSIS — R05.3 CHRONIC COUGH: ICD-10-CM

## 2025-07-30 DIAGNOSIS — G47.33 OBSTRUCTIVE SLEEP APNEA (ADULT) (PEDIATRIC): ICD-10-CM

## 2025-07-30 PROCEDURE — 99214 OFFICE O/P EST MOD 30 MIN: CPT

## 2025-07-30 PROCEDURE — G2211 COMPLEX E/M VISIT ADD ON: CPT

## 2025-07-30 RX ORDER — FLUTICASONE PROPIONATE 50 UG/1
50 SPRAY NASAL
Qty: 1 | Refills: 5 | Status: ACTIVE | COMMUNITY
Start: 2025-07-30 | End: 1900-01-01

## 2025-07-30 RX ORDER — AZELASTINE HYDROCHLORIDE 137 UG/1
0.1 SPRAY, METERED NASAL
Qty: 30 | Refills: 5 | Status: ACTIVE | COMMUNITY
Start: 2025-07-30 | End: 1900-01-01

## 2025-09-11 ENCOUNTER — TRANSCRIPTION ENCOUNTER (OUTPATIENT)
Age: 85
End: 2025-09-11

## 2025-09-11 ENCOUNTER — RESULT REVIEW (OUTPATIENT)
Age: 85
End: 2025-09-11

## 2025-09-11 ENCOUNTER — OUTPATIENT (OUTPATIENT)
Dept: OUTPATIENT SERVICES | Facility: HOSPITAL | Age: 85
LOS: 1 days | Discharge: HOME | End: 2025-09-11
Payer: MEDICARE

## 2025-09-11 VITALS
RESPIRATION RATE: 18 BRPM | OXYGEN SATURATION: 98 % | TEMPERATURE: 97 F | WEIGHT: 197.98 LBS | HEIGHT: 66 IN | SYSTOLIC BLOOD PRESSURE: 149 MMHG | HEART RATE: 85 BPM | DIASTOLIC BLOOD PRESSURE: 86 MMHG

## 2025-09-11 VITALS
OXYGEN SATURATION: 98 % | SYSTOLIC BLOOD PRESSURE: 134 MMHG | HEART RATE: 86 BPM | RESPIRATION RATE: 18 BRPM | DIASTOLIC BLOOD PRESSURE: 79 MMHG

## 2025-09-11 DIAGNOSIS — K86.2 CYST OF PANCREAS: ICD-10-CM

## 2025-09-11 DIAGNOSIS — Z98.890 OTHER SPECIFIED POSTPROCEDURAL STATES: Chronic | ICD-10-CM

## 2025-09-11 DIAGNOSIS — Z90.49 ACQUIRED ABSENCE OF OTHER SPECIFIED PARTS OF DIGESTIVE TRACT: Chronic | ICD-10-CM

## 2025-09-11 PROCEDURE — 82150 ASSAY OF AMYLASE: CPT

## 2025-09-11 PROCEDURE — 43239 EGD BIOPSY SINGLE/MULTIPLE: CPT | Mod: XU

## 2025-09-11 PROCEDURE — 88305 TISSUE EXAM BY PATHOLOGIST: CPT

## 2025-09-11 PROCEDURE — 82945 GLUCOSE OTHER FLUID: CPT

## 2025-09-11 PROCEDURE — 88173 CYTOPATH EVAL FNA REPORT: CPT

## 2025-09-11 PROCEDURE — 88312 SPECIAL STAINS GROUP 1: CPT

## 2025-09-11 PROCEDURE — 88312 SPECIAL STAINS GROUP 1: CPT | Mod: 26

## 2025-09-11 PROCEDURE — 43242 EGD US FINE NEEDLE BX/ASPIR: CPT

## 2025-09-11 PROCEDURE — 82378 CARCINOEMBRYONIC ANTIGEN: CPT

## 2025-09-11 PROCEDURE — 88305 TISSUE EXAM BY PATHOLOGIST: CPT | Mod: 26

## 2025-09-11 RX ORDER — CIPROFLOXACIN HYDROCHLORIDE 500 MG/1
500 TABLET, FILM COATED ORAL
Qty: 10 | Refills: 0 | Status: ACTIVE | COMMUNITY
Start: 2025-09-11 | End: 1900-01-01

## 2025-09-11 RX ORDER — CIPROFLOXACIN HCL 250 MG
1 TABLET ORAL
Qty: 5 | Refills: 0
Start: 2025-09-11 | End: 2025-09-15

## 2025-09-11 RX ORDER — CIPROFLOXACIN HCL 250 MG
400 TABLET ORAL ONCE
Refills: 0 | Status: COMPLETED | OUTPATIENT
Start: 2025-09-11 | End: 2025-09-11

## 2025-09-11 RX ADMIN — Medication 200 MILLIGRAM(S): at 12:30

## 2025-09-12 LAB — SURGICAL PATHOLOGY STUDY: SIGNIFICANT CHANGE UP

## 2025-09-14 LAB
CEA FLD-MCNC: 237 NG/ML — SIGNIFICANT CHANGE UP
SPECIMEN SOURCE: SIGNIFICANT CHANGE UP

## 2025-09-15 LAB — NON-GYNECOLOGICAL CYTOLOGY STUDY: SIGNIFICANT CHANGE UP

## 2025-09-17 ENCOUNTER — APPOINTMENT (OUTPATIENT)
Dept: PULMONOLOGY | Facility: CLINIC | Age: 85
End: 2025-09-17

## 2025-09-18 DIAGNOSIS — K29.70 GASTRITIS, UNSPECIFIED, WITHOUT BLEEDING: ICD-10-CM

## 2025-09-18 DIAGNOSIS — K86.2 CYST OF PANCREAS: ICD-10-CM

## 2025-09-18 DIAGNOSIS — Z88.2 ALLERGY STATUS TO SULFONAMIDES: ICD-10-CM

## 2025-09-18 DIAGNOSIS — Z88.5 ALLERGY STATUS TO NARCOTIC AGENT: ICD-10-CM

## 2025-09-18 DIAGNOSIS — R89.6 ABNORMAL CYTOLOGICAL FINDINGS IN SPECIMENS FROM OTHER ORGANS, SYSTEMS AND TISSUES: ICD-10-CM
